# Patient Record
Sex: MALE | Race: WHITE | NOT HISPANIC OR LATINO
[De-identification: names, ages, dates, MRNs, and addresses within clinical notes are randomized per-mention and may not be internally consistent; named-entity substitution may affect disease eponyms.]

---

## 2024-02-20 ENCOUNTER — RESULT REVIEW (OUTPATIENT)
Age: 29
End: 2024-02-20

## 2024-02-21 ENCOUNTER — RESULT REVIEW (OUTPATIENT)
Age: 29
End: 2024-02-21

## 2024-02-21 ENCOUNTER — APPOINTMENT (OUTPATIENT)
Dept: THORACIC SURGERY | Facility: HOSPITAL | Age: 29
End: 2024-02-21

## 2024-02-22 ENCOUNTER — RESULT REVIEW (OUTPATIENT)
Age: 29
End: 2024-02-22

## 2024-02-22 PROBLEM — Z00.00 ENCOUNTER FOR PREVENTIVE HEALTH EXAMINATION: Status: ACTIVE | Noted: 2024-02-22

## 2024-02-26 ENCOUNTER — TRANSCRIPTION ENCOUNTER (OUTPATIENT)
Age: 29
End: 2024-02-26

## 2024-02-28 ENCOUNTER — TRANSCRIPTION ENCOUNTER (OUTPATIENT)
Age: 29
End: 2024-02-28

## 2024-02-28 DIAGNOSIS — D64.9 ANEMIA, UNSPECIFIED: ICD-10-CM

## 2024-02-29 ENCOUNTER — APPOINTMENT (OUTPATIENT)
Dept: THORACIC SURGERY | Facility: CLINIC | Age: 29
End: 2024-02-29

## 2024-02-29 VITALS
OXYGEN SATURATION: 99 % | SYSTOLIC BLOOD PRESSURE: 110 MMHG | RESPIRATION RATE: 16 BRPM | BODY MASS INDEX: 19.89 KG/M2 | WEIGHT: 160 LBS | DIASTOLIC BLOOD PRESSURE: 61 MMHG | HEIGHT: 75 IN | HEART RATE: 80 BPM

## 2024-02-29 NOTE — REASON FOR VISIT
[de-identified] : left VATS drainage of hemothorax, apical wedge resection & mechanical pleurodesis [de-identified] : On 2/20, patient presented to [facility] ED with cc of pain & sob.  He was found to have large left pneumothorax.  Thoravent was placed in ED and patient admitted to thoracic surgery service.  On 2/21, patient underwent above procedure without complication. He tolerated procedure well and was recovered in PACU.  His CT was d/c'd on POD 2 and patient was discharged home with f/u instructions and PO pain meds. He now presents for his post operative appointment.  He presents today doing well and endorses that he has returned to work and has resumed his normal activities. He denies any respiratory symptoms at this time.  [de-identified] : 2/21/2024

## 2024-02-29 NOTE — COUNSELING
[FreeTextEntry1] : Pathology 2/21/2024  Specimen(s)    A. LEFT UPPER LOBE APICAL WEDGE    Clinical History    Pre-op Diagnosis: Pneumothorax Post-op Diagnosis: Same    FINAL PATHOLOGIC DIAGNOSIS    A. LEFT UPPER LOBE, APICAL WEDGE: -Portion of lung with fibroelastotic scar of pleura, compatible with apical cap, and hemosiderin-laden macrophages consistent with recent hemorrhage   RENETTA/RENETTA/02/23/24 - 1943    J94.8 	                  *** Electronically Signed Out *** 	David Ralph  02/23/24 1943

## 2024-02-29 NOTE — PHYSICAL EXAM
[] : no respiratory distress [Respiration, Rhythm And Depth] : normal respiratory rhythm and effort [Auscultation Breath Sounds / Voice Sounds] : lungs were clear to auscultation bilaterally [Heart Sounds] : normal S1 and S2 [Heart Rate And Rhythm] : heart rate was normal and rhythm regular [Site: ___] : Site: [unfilled] [Dry] : dry [Clean] : clean [Healing Well] : healing well

## 2024-02-29 NOTE — DISCUSSION/SUMMARY
[Excellent Pain Control] : has excellent pain control [Doing Well] : is doing well [No Sign of Infection] : is showing no signs of infection [0] : 0 [Remove Sutures/Staples] : removed sutures/staples [FreeTextEntry1] : Surgical site clean dry and intact with no signs or symptoms of infection upon assessment. Suture x1 removed without incident. Patient endorses good pain control. All questions addressed. Pt. verbalized understanding of looking for signs and symptoms of infection and will call thoracic office with any questions or concerns.